# Patient Record
Sex: FEMALE | Race: WHITE | NOT HISPANIC OR LATINO | URBAN - METROPOLITAN AREA
[De-identification: names, ages, dates, MRNs, and addresses within clinical notes are randomized per-mention and may not be internally consistent; named-entity substitution may affect disease eponyms.]

---

## 2017-02-22 ENCOUNTER — AMBULATORY - HEALTHEAST (OUTPATIENT)
Dept: LAB | Facility: CLINIC | Age: 82
End: 2017-02-22

## 2017-02-22 ENCOUNTER — COMMUNICATION - HEALTHEAST (OUTPATIENT)
Dept: LAB | Facility: CLINIC | Age: 82
End: 2017-02-22

## 2017-02-22 DIAGNOSIS — E03.9 HYPOTHYROID: ICD-10-CM

## 2017-02-23 ENCOUNTER — AMBULATORY - HEALTHEAST (OUTPATIENT)
Dept: FAMILY MEDICINE | Facility: CLINIC | Age: 82
End: 2017-02-23

## 2017-02-23 DIAGNOSIS — E03.9 HYPOTHYROID: ICD-10-CM

## 2017-03-01 ENCOUNTER — OFFICE VISIT - HEALTHEAST (OUTPATIENT)
Dept: FAMILY MEDICINE | Facility: CLINIC | Age: 82
End: 2017-03-01

## 2017-03-01 DIAGNOSIS — R05.9 COUGH: ICD-10-CM

## 2017-03-28 ENCOUNTER — COMMUNICATION - HEALTHEAST (OUTPATIENT)
Dept: FAMILY MEDICINE | Facility: CLINIC | Age: 82
End: 2017-03-28

## 2017-03-30 ENCOUNTER — OFFICE VISIT - HEALTHEAST (OUTPATIENT)
Dept: FAMILY MEDICINE | Facility: CLINIC | Age: 82
End: 2017-03-30

## 2017-03-30 DIAGNOSIS — R05.9 COUGH: ICD-10-CM

## 2017-03-30 ASSESSMENT — MIFFLIN-ST. JEOR: SCORE: 1165.51

## 2017-04-06 ENCOUNTER — RECORDS - HEALTHEAST (OUTPATIENT)
Dept: ADMINISTRATIVE | Facility: OTHER | Age: 82
End: 2017-04-06

## 2017-04-06 ENCOUNTER — RECORDS - HEALTHEAST (OUTPATIENT)
Dept: BONE DENSITY | Facility: CLINIC | Age: 82
End: 2017-04-06

## 2017-04-06 DIAGNOSIS — Z78.0 ASYMPTOMATIC MENOPAUSAL STATE: ICD-10-CM

## 2017-04-06 DIAGNOSIS — R05.9 COUGH: ICD-10-CM

## 2017-04-11 ENCOUNTER — RECORDS - HEALTHEAST (OUTPATIENT)
Dept: ADMINISTRATIVE | Facility: OTHER | Age: 82
End: 2017-04-11

## 2017-04-20 ENCOUNTER — RECORDS - HEALTHEAST (OUTPATIENT)
Dept: ADMINISTRATIVE | Facility: OTHER | Age: 82
End: 2017-04-20

## 2017-04-24 ENCOUNTER — AMBULATORY - HEALTHEAST (OUTPATIENT)
Dept: FAMILY MEDICINE | Facility: CLINIC | Age: 82
End: 2017-04-24

## 2017-04-24 ENCOUNTER — OFFICE VISIT - HEALTHEAST (OUTPATIENT)
Dept: FAMILY MEDICINE | Facility: CLINIC | Age: 82
End: 2017-04-24

## 2017-04-24 DIAGNOSIS — R05.9 COUGH: ICD-10-CM

## 2017-04-24 DIAGNOSIS — E03.9 HYPOTHYROID: ICD-10-CM

## 2017-04-24 DIAGNOSIS — I10 ESSENTIAL HYPERTENSION: ICD-10-CM

## 2017-04-24 LAB
CHOLEST SERPL-MCNC: 242 MG/DL
FASTING STATUS PATIENT QL REPORTED: YES
HDLC SERPL-MCNC: 67 MG/DL
LDLC SERPL CALC-MCNC: 154 MG/DL
TRIGL SERPL-MCNC: 103 MG/DL

## 2017-04-24 RX ORDER — GABAPENTIN 100 MG/1
CAPSULE ORAL
Refills: 1 | Status: SHIPPED | COMMUNITY
Start: 2017-04-11

## 2017-04-24 RX ORDER — GABAPENTIN 300 MG/1
CAPSULE ORAL
Refills: 1 | Status: SHIPPED | COMMUNITY
Start: 2017-04-11

## 2017-05-24 ENCOUNTER — RECORDS - HEALTHEAST (OUTPATIENT)
Dept: ADMINISTRATIVE | Facility: OTHER | Age: 82
End: 2017-05-24

## 2017-06-20 ENCOUNTER — RECORDS - HEALTHEAST (OUTPATIENT)
Dept: ADMINISTRATIVE | Facility: OTHER | Age: 82
End: 2017-06-20

## 2017-06-20 LAB
LAB AP CHARGES (HE HISTORICAL CONVERSION): NORMAL
PATH REPORT.COMMENTS IMP SPEC: NORMAL
PATH REPORT.COMMENTS IMP SPEC: NORMAL
PATH REPORT.FINAL DX SPEC: NORMAL
PATH REPORT.GROSS SPEC: NORMAL
PATH REPORT.MICROSCOPIC SPEC OTHER STN: NORMAL
PATH REPORT.RELEVANT HX SPEC: NORMAL
RESULT FLAG (HE HISTORICAL CONVERSION): NORMAL

## 2017-07-31 ENCOUNTER — COMMUNICATION - HEALTHEAST (OUTPATIENT)
Dept: FAMILY MEDICINE | Facility: CLINIC | Age: 82
End: 2017-07-31

## 2017-07-31 DIAGNOSIS — E03.9 HYPOTHYROIDISM: ICD-10-CM

## 2017-09-19 ENCOUNTER — OFFICE VISIT - HEALTHEAST (OUTPATIENT)
Dept: FAMILY MEDICINE | Facility: CLINIC | Age: 82
End: 2017-09-19

## 2017-09-19 DIAGNOSIS — I10 HYPERTENSION: ICD-10-CM

## 2017-09-19 DIAGNOSIS — R05.9 COUGH: ICD-10-CM

## 2017-09-29 ENCOUNTER — RECORDS - HEALTHEAST (OUTPATIENT)
Dept: ADMINISTRATIVE | Facility: OTHER | Age: 82
End: 2017-09-29

## 2017-10-02 ENCOUNTER — HOSPITAL ENCOUNTER (OUTPATIENT)
Dept: ULTRASOUND IMAGING | Facility: HOSPITAL | Age: 82
Discharge: HOME | End: 2017-10-02
Attending: PSYCHIATRY & NEUROLOGY

## 2017-10-02 DIAGNOSIS — G43.109 COMPLICATED MIGRAINE: ICD-10-CM

## 2017-10-02 DIAGNOSIS — H53.9 TRANSIENT VISION DISTURBANCE OF BOTH EYES: ICD-10-CM

## 2017-10-02 DIAGNOSIS — I10 HTN (HYPERTENSION): ICD-10-CM

## 2017-10-02 DIAGNOSIS — H54.7 VISUAL LOSS: ICD-10-CM

## 2017-10-09 ENCOUNTER — COMMUNICATION - HEALTHEAST (OUTPATIENT)
Dept: FAMILY MEDICINE | Facility: CLINIC | Age: 82
End: 2017-10-09

## 2017-10-09 RX ORDER — BLOOD PRESSURE TEST KIT
KIT MISCELLANEOUS
Qty: 1 EACH | Refills: 0 | Status: SHIPPED | OUTPATIENT
Start: 2017-10-09

## 2017-10-24 ENCOUNTER — OFFICE VISIT - HEALTHEAST (OUTPATIENT)
Dept: FAMILY MEDICINE | Facility: CLINIC | Age: 82
End: 2017-10-24

## 2017-10-24 DIAGNOSIS — M25.552 ACUTE HIP PAIN, LEFT: ICD-10-CM

## 2017-10-24 DIAGNOSIS — S00.03XA HEMATOMA OF SCALP, INITIAL ENCOUNTER: ICD-10-CM

## 2017-10-27 ENCOUNTER — OFFICE VISIT - HEALTHEAST (OUTPATIENT)
Dept: FAMILY MEDICINE | Facility: CLINIC | Age: 82
End: 2017-10-27

## 2017-10-27 DIAGNOSIS — M53.3 PAIN IN THE COCCYX: ICD-10-CM

## 2017-10-27 DIAGNOSIS — M54.50 LOW BACK PAIN: ICD-10-CM

## 2017-10-30 ENCOUNTER — COMMUNICATION - HEALTHEAST (OUTPATIENT)
Dept: FAMILY MEDICINE | Facility: CLINIC | Age: 82
End: 2017-10-30

## 2017-10-30 DIAGNOSIS — M54.50 LOWER BACK PAIN: ICD-10-CM

## 2017-10-31 ENCOUNTER — RECORDS - HEALTHEAST (OUTPATIENT)
Dept: ADMINISTRATIVE | Facility: OTHER | Age: 82
End: 2017-10-31

## 2017-11-08 ENCOUNTER — RECORDS - HEALTHEAST (OUTPATIENT)
Dept: ADMINISTRATIVE | Facility: OTHER | Age: 82
End: 2017-11-08

## 2017-12-12 ENCOUNTER — RECORDS - HEALTHEAST (OUTPATIENT)
Dept: ADMINISTRATIVE | Facility: OTHER | Age: 82
End: 2017-12-12

## 2017-12-13 ENCOUNTER — COMMUNICATION - HEALTHEAST (OUTPATIENT)
Dept: FAMILY MEDICINE | Facility: CLINIC | Age: 82
End: 2017-12-13

## 2017-12-13 DIAGNOSIS — R05.9 COUGH: ICD-10-CM

## 2017-12-15 RX ORDER — HYDROCHLOROTHIAZIDE 12.5 MG/1
TABLET ORAL
Qty: 90 TABLET | Refills: 3 | Status: SHIPPED | OUTPATIENT
Start: 2017-12-15

## 2017-12-21 ENCOUNTER — RECORDS - HEALTHEAST (OUTPATIENT)
Dept: ADMINISTRATIVE | Facility: OTHER | Age: 82
End: 2017-12-21

## 2018-01-04 ENCOUNTER — OFFICE VISIT - HEALTHEAST (OUTPATIENT)
Dept: FAMILY MEDICINE | Facility: CLINIC | Age: 83
End: 2018-01-04

## 2018-01-04 DIAGNOSIS — R05.9 COUGH: ICD-10-CM

## 2018-02-01 ENCOUNTER — RECORDS - HEALTHEAST (OUTPATIENT)
Dept: ADMINISTRATIVE | Facility: OTHER | Age: 83
End: 2018-02-01

## 2018-05-02 ENCOUNTER — RECORDS - HEALTHEAST (OUTPATIENT)
Dept: ADMINISTRATIVE | Facility: OTHER | Age: 83
End: 2018-05-02

## 2018-05-21 ENCOUNTER — COMMUNICATION - HEALTHEAST (OUTPATIENT)
Dept: FAMILY MEDICINE | Facility: CLINIC | Age: 83
End: 2018-05-21

## 2018-05-21 DIAGNOSIS — E03.9 HYPOTHYROIDISM: ICD-10-CM

## 2018-05-21 RX ORDER — LEVOTHYROXINE SODIUM 50 UG/1
TABLET ORAL
Qty: 40 TABLET | Refills: 3 | Status: SHIPPED | OUTPATIENT
Start: 2018-05-21

## 2018-08-07 ENCOUNTER — OFFICE VISIT - HEALTHEAST (OUTPATIENT)
Dept: FAMILY MEDICINE | Facility: CLINIC | Age: 83
End: 2018-08-07

## 2018-08-07 DIAGNOSIS — E03.9 HYPOTHYROIDISM: ICD-10-CM

## 2018-08-07 DIAGNOSIS — I10 HTN (HYPERTENSION): ICD-10-CM

## 2018-08-07 LAB
ANION GAP SERPL CALCULATED.3IONS-SCNC: 10 MMOL/L (ref 5–18)
BUN SERPL-MCNC: 16 MG/DL (ref 8–28)
CALCIUM SERPL-MCNC: 10.6 MG/DL (ref 8.5–10.5)
CHLORIDE BLD-SCNC: 102 MMOL/L (ref 98–107)
CO2 SERPL-SCNC: 28 MMOL/L (ref 22–31)
CREAT SERPL-MCNC: 0.98 MG/DL (ref 0.6–1.1)
GFR SERPL CREATININE-BSD FRML MDRD: 54 ML/MIN/1.73M2
GLUCOSE BLD-MCNC: 105 MG/DL (ref 70–125)
POTASSIUM BLD-SCNC: 4.6 MMOL/L (ref 3.5–5)
SODIUM SERPL-SCNC: 140 MMOL/L (ref 136–145)
T4 FREE SERPL-MCNC: 0.9 NG/DL (ref 0.7–1.8)
TSH SERPL DL<=0.005 MIU/L-ACNC: 5.21 UIU/ML (ref 0.3–5)

## 2018-08-16 ENCOUNTER — COMMUNICATION - HEALTHEAST (OUTPATIENT)
Dept: FAMILY MEDICINE | Facility: CLINIC | Age: 83
End: 2018-08-16

## 2019-01-19 ENCOUNTER — RECORDS - HEALTHEAST (OUTPATIENT)
Dept: ADMINISTRATIVE | Facility: OTHER | Age: 84
End: 2019-01-19

## 2019-02-12 ENCOUNTER — RECORDS - HEALTHEAST (OUTPATIENT)
Dept: ADMINISTRATIVE | Facility: OTHER | Age: 84
End: 2019-02-12

## 2019-03-01 ENCOUNTER — RECORDS - HEALTHEAST (OUTPATIENT)
Dept: ADMINISTRATIVE | Facility: OTHER | Age: 84
End: 2019-03-01

## 2019-03-08 ENCOUNTER — RECORDS - HEALTHEAST (OUTPATIENT)
Dept: ADMINISTRATIVE | Facility: OTHER | Age: 84
End: 2019-03-08

## 2019-03-12 ENCOUNTER — RECORDS - HEALTHEAST (OUTPATIENT)
Dept: ADMINISTRATIVE | Facility: OTHER | Age: 84
End: 2019-03-12

## 2019-04-09 ENCOUNTER — RECORDS - HEALTHEAST (OUTPATIENT)
Dept: ADMINISTRATIVE | Facility: OTHER | Age: 84
End: 2019-04-09

## 2021-05-30 ENCOUNTER — RECORDS - HEALTHEAST (OUTPATIENT)
Dept: ADMINISTRATIVE | Facility: CLINIC | Age: 86
End: 2021-05-30

## 2021-05-30 VITALS — BODY MASS INDEX: 27.98 KG/M2 | HEIGHT: 64 IN | WEIGHT: 163.9 LBS

## 2021-05-30 VITALS — BODY MASS INDEX: 28.26 KG/M2 | WEIGHT: 162.06 LBS

## 2021-05-30 VITALS — BODY MASS INDEX: 28.46 KG/M2 | WEIGHT: 163.2 LBS

## 2021-05-31 VITALS — WEIGHT: 164.1 LBS | BODY MASS INDEX: 28.61 KG/M2

## 2021-05-31 VITALS — WEIGHT: 168 LBS | BODY MASS INDEX: 29.29 KG/M2

## 2021-05-31 VITALS — WEIGHT: 165 LBS | BODY MASS INDEX: 28.77 KG/M2

## 2021-05-31 VITALS — WEIGHT: 160.7 LBS | BODY MASS INDEX: 28.02 KG/M2

## 2021-06-01 ENCOUNTER — RECORDS - HEALTHEAST (OUTPATIENT)
Dept: ADMINISTRATIVE | Facility: CLINIC | Age: 86
End: 2021-06-01

## 2021-06-01 VITALS — WEIGHT: 160.56 LBS | BODY MASS INDEX: 28 KG/M2

## 2021-06-02 ENCOUNTER — RECORDS - HEALTHEAST (OUTPATIENT)
Dept: ADMINISTRATIVE | Facility: CLINIC | Age: 86
End: 2021-06-02

## 2021-06-05 ENCOUNTER — RECORDS - HEALTHEAST (OUTPATIENT)
Dept: INTERNAL MEDICINE | Facility: CLINIC | Age: 86
End: 2021-06-05

## 2021-06-05 DIAGNOSIS — R42 DIZZINESS AND GIDDINESS: ICD-10-CM

## 2021-06-09 NOTE — PROGRESS NOTES
81-year-old female presents with a one-week history of upper respiratory tract infection particularly that of cough.  She does not have any other systemic symptoms to indicate use for antibiotics.  I recommend supportive cares in time.  Declined cough medicine.  I would like her to follow-up with her primary care provider if she is not better in 1-2 weeks.  Patient verbalized understanding and agree with the plan      ASSESSMENT/PLAN:  1. Cough    CHIEF COMPLAINT:  Chief Complaint   Patient presents with     Cough     x 1 week, productive cough. Took Mucinex did not help     Fatigue     Headache       HISTORY OF PRESENT ILLNESS:  Keara is a 81 y.o. female presenting to the clinic today for a cough. She has had this for one week. Her cough was dry, but starting to become productive today. No fevers. No chest pain or shortness of breath. She does have headaches, and has been feeling tired. She does not have any underlying lung problems. Her  has been sick as well. Her pertussis vaccine is up to date. She did not get her seasonal influenza. She has been taking Mucinex and Tylenol with little relief.     REVIEW OF SYSTEMS:   No chills or body aches. All other systems are negative.    PFSH:  No new history.     TOBACCO USE:  History   Smoking Status     Never Smoker   Smokeless Tobacco     Never Used       VITALS:  Vitals:    03/01/17 1234   BP: 112/64   Pulse: (!) 59   Temp: 98  F (36.7  C)   TempSrc: Oral   SpO2: 96%   Weight: 162 lb 1 oz (73.5 kg)     Wt Readings from Last 3 Encounters:   03/01/17 162 lb 1 oz (73.5 kg)   12/22/16 158 lb 9.6 oz (71.9 kg)   09/27/16 154 lb 12.8 oz (70.2 kg)       PHYSICAL EXAM:  Constitutional: Patient is oriented to person, place, and time. Patient appears well-developed and well-nourished. No distress.   Head: Normocephalic and atraumatic.   Right Ear: External ear normal. Mild erythema to TM.  Left Ear: External ear normal. Mild erythema to TM.  Nose: Nose normal.    Mouth/Throat: Oropharynx is clear and moist. No oropharyngeal exudate.   Eyes: Conjunctivae and EOM are normal. Pupils are equal, round, and reactive to light. Right eye exhibits no discharge. Left eye exhibits no discharge. No scleral icterus.   Neck: Neck supple. No JVD present. No tracheal deviation present. No thyromegaly present.   Cardiovascular: Normal rate, regular rhythm, normal heart sounds and intact distal pulses. No murmur heard.   Pulmonary/Chest: Effort normal and breath sounds normal. No stridor. No respiratory distress. Patient has no wheezes, no rales, exhibits no tenderness.       Results for orders placed or performed in visit on 02/22/17   Thyroid Stimulating Hormone (TSH)   Result Value Ref Range    TSH 7.10 (H) 0.30 - 5.00 uIU/mL         ADDITIONAL HISTORY SUMMARIZED (2): None.  DECISION TO OBTAIN EXTRA INFORMATION (1): None.   RADIOLOGY TESTS (1): None.  LABS (1): None.  MEDICINE TESTS (1): None.  INDEPENDENT REVIEW (2 each): None.     The visit lasted a total of 7 minutes face to face with the patient. Over 50% of the time was spent counseling and educating the patient about viral illness care.    Brittany WALKER, am scribing for and in the presence of, Dr. Martinez.    Dr. Michelle WALKER, personally performed the services described in this documentation, as scribed by Brittany Turner in my presence, and it is both accurate and complete.    MEDICATIONS:  Current Outpatient Prescriptions   Medication Sig Dispense Refill     calcium carbonate (OS-EMILY) 600 mg (1,500 mg) tablet Take 600 mg by mouth daily.        cholecalciferol, vitamin D3, 1,000 unit tablet Take 1,000 Units by mouth daily.       levothyroxine (SYNTHROID) 50 MCG tablet Take 1 tablet orally daily except for 2 days of the week, take 2 tablets daily on 2 days of the week 40 tablet 1     lisinopril-hydrochlorothiazide (PRINZIDE,ZESTORETIC) 20-12.5 mg per tablet TAKE 1 TABLET BY MOUTH DAILY. 90 tablet 1     No current  facility-administered medications for this visit.        Total data points: 0

## 2021-06-09 NOTE — PROGRESS NOTES
81-year-old female complains of a nonproductive cough of about 4 weeks duration.  She was seen several weeks ago by me and told to continue to monitor her symptoms.  Her symptoms have not improved.  I will like to switch her blood pressure medication (lisinopril-HCTZ) over to just hydrochlorothiazide.  She will come back in a couple weeks for blood pressure check.  I will also give her ranitidine for her GERD symptoms and Claritin for her postnasal drip.  As for her cough, she may take Tessalon Perles every 6 hours as needed.  Patient has a follow-up appointment with her primary care provider to discuss her thyroid and and I recommend that she follows up regarding her cough at this same visit if possible.  If her symptoms do not improve over the next week or if they worsen then to call me.  The patient verbalized understanding and agreed with the plan.    ASSESSMENT/PLAN:  1. Cough  - hydroCHLOROthiazide (HYDRODIURIL) 12.5 MG tablet; Take 1 tablet (12.5 mg total) by mouth daily.  Dispense: 30 tablet; Refill: 0  - ranitidine (ZANTAC) 150 MG tablet; Take one tablet daily  Dispense: 30 tablet; Refill: 0  - loratadine (CLARITIN) 10 mg tablet; Take 1 tablet (10 mg total) by mouth daily.  Dispense: 30 tablet; Refill: 0  - benzonatate (TESSALON PERLES) 100 MG capsule; Take 1 capsule (100 mg total) by mouth every 6 (six) hours as needed for cough.  Dispense: 30 capsule; Refill: 0      CHIEF COMPLAINT:  Chief Complaint   Patient presents with     Cough     cough        HISTORY OF PRESENT ILLNESS:  Keara is a 81 y.o. female presenting to the clinic today for a follow up for a cough. She was seen for cough on 3/1/2017, and supportive cares were recommended. She does take lisinopril-hydrochlorothiazide, and feels like this is contributing to her cough. She has been on lisinopril for 2-3 years. This has been controlling her blood pressure well. She has a dry hacking cough. She is having some rhinorrhea. She feels like her cough  "is causing some abdominal and back soreness. She does not have any environmental allergies. She does have reflux/heartburn. She is not taking anything for her reflux symptoms.     REVIEW OF SYSTEMS:   Denies fevers, chills, body aches. All other systems are negative.    PFSH:  Not a smoker, no smoke exposure.     TOBACCO USE:  History   Smoking Status     Never Smoker   Smokeless Tobacco     Never Used       VITALS:  Vitals:    03/30/17 1418   BP: 118/72   Patient Site: Left Arm   Patient Position: Sitting   Cuff Size: Adult Regular   Pulse: (!) 56   Temp: 98  F (36.7  C)   SpO2: 98%   Weight: 163 lb 14.4 oz (74.3 kg)   Height: 5' 3.5\" (1.613 m)     Wt Readings from Last 3 Encounters:   03/30/17 163 lb 14.4 oz (74.3 kg)   03/01/17 162 lb 1 oz (73.5 kg)   12/22/16 158 lb 9.6 oz (71.9 kg)       PHYSICAL EXAM:  Constitutional: Patient is oriented to person, place, and time. Patient appears well-developed and well-nourished. No distress.   Head: Normocephalic and atraumatic.   Right Ear: External ear normal. Ear canal and TM normal.   Left Ear: External ear normal. Ear canal and TM normal.   Nose: Nose normal.   Mouth/Throat: Oropharynx is clear and moist. No oropharyngeal exudate.   Eyes: Conjunctivae and EOM are normal. Pupils are equal, round, and reactive to light. Right eye exhibits no discharge. Left eye exhibits no discharge. No scleral icterus.   Cardiovascular: Normal rate, regular rhythm, normal heart sounds and intact distal pulses. No murmur heard.   Pulmonary/Chest: Effort normal and breath sounds normal. No stridor. No respiratory distress. Patient has no wheezes, no rales, exhibits no tenderness.       Results for orders placed or performed in visit on 02/22/17   Thyroid Stimulating Hormone (TSH)   Result Value Ref Range    TSH 7.10 (H) 0.30 - 5.00 uIU/mL         ADDITIONAL HISTORY SUMMARIZED (2): None.  DECISION TO OBTAIN EXTRA INFORMATION (1): None.   RADIOLOGY TESTS (1): None.  LABS (1): " None.  MEDICINE TESTS (1): None.  INDEPENDENT REVIEW (2 each): None.     The visit lasted a total of 18 minutes face to face with the patient. Over 50% of the time was spent counseling and educating the patient about cough etiologies.    Brittany WALKER, am scribing for and in the presence of, Dr. Martinez.    Dr. Michelle WALKER, personally performed the services described in this documentation, as scribed by Brittany Turner in my presence, and it is both accurate and complete.    MEDICATIONS:  Current Outpatient Prescriptions   Medication Sig Dispense Refill     calcium carbonate (OS-EMILY) 600 mg (1,500 mg) tablet Take 600 mg by mouth daily.        cholecalciferol, vitamin D3, 1,000 unit tablet Take 1,000 Units by mouth daily.       levothyroxine (SYNTHROID) 50 MCG tablet Take 1 tablet orally daily except for 2 days of the week, take 2 tablets daily on 2 days of the week 40 tablet 1     lisinopril-hydrochlorothiazide (PRINZIDE,ZESTORETIC) 20-12.5 mg per tablet TAKE 1 TABLET BY MOUTH DAILY. 90 tablet 1     oxybutynin (DITROPAN) 5 MG tablet Take 5 mg by mouth.       benzonatate (TESSALON PERLES) 100 MG capsule Take 1 capsule (100 mg total) by mouth every 6 (six) hours as needed for cough. 30 capsule 0     hydroCHLOROthiazide (HYDRODIURIL) 12.5 MG tablet Take 1 tablet (12.5 mg total) by mouth daily. 30 tablet 0     loratadine (CLARITIN) 10 mg tablet Take 1 tablet (10 mg total) by mouth daily. 30 tablet 0     ranitidine (ZANTAC) 150 MG tablet Take one tablet daily 30 tablet 0     No current facility-administered medications for this visit.        Total data points: 0

## 2021-06-10 NOTE — PROGRESS NOTES
Assessment/Plan:        Diagnoses and all orders for this visit:    Essential hypertension  -     Lipid Cascade  -     Basic Metabolic Panel    Cough  -     hydroCHLOROthiazide (HYDRODIURIL) 12.5 MG tablet; Take 1 tablet (12.5 mg total) by mouth daily.  Dispense: 30 tablet; Refill: 5    Hypothyroid  -     Thyroid Stimulating Hormone (TSH)    Lump    Other orders  -     gabapentin (NEURONTIN) 100 MG capsule; ; Refill: 1  -     gabapentin (NEURONTIN) 300 MG capsule; ; Refill: 1  -     Cancel: levothyroxine (SYNTHROID) 50 MCG tablet; Take 1 tablet orally daily except for 2 days of the week, take 2 tablets daily on 2 days of the week  Dispense: 40 tablet; Refill: 1        For her hypertension, continue with hydrochlorothiazide.  Monitor blood pressure and discuss goals.  Lisinopril less likely causing her cough.  Cough has been chronic and has been stable.  Will hold off on imaging at this time.  She is to start omeprazole 20 mg daily for 2 weeks.  Update me in 2 weeks.  We will also do labs.  Await results prior to prescription refill for levothyroxine.  For the lump in her left cheek, looks benign.  Closely monitor for changes with time.  Avoid picking on it.  Continue with a warm compress as needed.  No additional treatment.  She was agreeable with the plans.  Subjective:    Patient ID: Keara Torres is a 81 y.o. female.    BROOKE Sarah is here for follow-up from her last office visit.  She was seen in of March.  She has been dealing with chronic cough for a couple of years, mostly dry and more noticeable at night.  She denies any fever, chills, shortness of breath.  No recent unexplained weight loss.  She denies any hemoptysis, loss of appetite.  She hydrate herself.  Sometimes feels like it tickle in her throat and has been hydrating herself.  Tried ranitidine and Claritin on a few instances with slight benefit.  She denies any issues with smoking.  Had increased amount of secondhand smoke exposure while she was  young.  She was also asked to switch her Prinzide to hydrochlorothiazide.  Has not been able to check her blood pressure since after the last visit.  Denies any chest pains, palpitations.    History of hypothyroidism.  History of esophageal stricture and occasional episodes of choking which has been stable for a number of years.  She has been taking her levothyroxine regularly as directed.  Denies any tremors, shaking.    She has been picking on her left cheek and was trying to squeeze out what she thought of as a joya.  Developed a lump and increase in size.  Has been stable since.  This was around 1 and half weeks ago.  Denies any pain, discharge or bleeding.  Tried warm compresses and the area.    Has been dealing with neck pain.  She has C3-C4 degenerative changes and will be having an injection today.  This is her first.  Will be following up with them in May after treatment.    Review of Systems  As above otherwise negative.          Objective:    Physical Exam  /78 (Patient Site: Left Arm, Patient Position: Sitting, Cuff Size: Adult Regular)  Pulse (!) 59  Wt 163 lb 3.2 oz (74 kg)  SpO2 98%  Breastfeeding? No  BMI 28.46 kg/m2    Vital signs noted above. AAO ×3.  HEENT no nasal discharge, moist oral mucosa. Ears:TMs intact, no fluid collection. Neck: Supple neck, nonpalpable cervical lymph nodes.  No thyromegaly.  Lungs: Clear to auscultation bilateral.  Heart: S1-S2 regular rate and rhythm, no murmurs were noted.  Abdomen:  with bowel sounds.  Extremities: No edema, pulses were full and equal.  Skin: Erythematous lump on her left cheek, beneath her left eye, soft, no discharge or bleeding, 1.5 x 1 cm.

## 2021-06-13 NOTE — PROGRESS NOTES
Assessment/Plan:        Diagnoses and all orders for this visit:    Cough    Hypertension    Other orders  -     azithromycin (ZITHROMAX Z-ROSA) 250 MG tablet; Take 2 tablets (500 mg) on  Day 1,  followed by 1 tablet (250 mg) once daily on Days 2 through 5.  Dispense: 6 tablet; Refill: 0        We will treat her with azithromycin.  Discussed regarding medication use and side effects.  Increase yogurt intake.  Fluid hydration.  Expectorant medication as needed.  Recheck in a week if persistent or worse.  To monitor her blood pressure and discuss goals.  If blood pressure uncontrolled, earlier follow-up.  Otherwise continue with hydrochlorothiazide 12.5 mg daily.  She was agreeable with the plans.  Subjective:    Patient ID: Keara Torres is a 82 y.o. female.    HPI    Keara is here with concerns about cough.  Started with a chest congestion, cold 10 days ago.  Denies any fever, chills.  Cough productive of yellowish green secretions.  No hemoptysis.  She denies any recent travel or exposure to anyone ill.  No shortness of breath.  Started to have decreased appetite in the past couple of days.  Sore throat and decrease in taste.  She does not hydrate herself as much.  Feels that the symptoms are worse.  Trying to take Aleve with minimal benefit.    She also has hypertension.  She has been doing well with the lisinopril but started having worsening of her cough.  Lisinopril was switched to hydrochlorothiazide last March.  She has not been able to check her blood pressure.  She feels that the medication is not as beneficial.  She feels that she is retaining fluid although not showing any edema.  Has been trying to eat healthy.  Denies any chest pains, palpitations    Review of Systems  As above otherwise negative.          Objective:    Physical Exam  /80 (Patient Site: Left Arm, Patient Position: Sitting, Cuff Size: Adult Regular)  Pulse (!) 58  Temp 98.1  F (36.7  C) (Oral)   Wt 164 lb 1.6 oz (74.4 kg)  SpO2  97%  Breastfeeding? No  BMI 28.61 kg/m2    Vital signs noted above. AAO ×3.  HEENT no nasal discharge, moist oral mucosa. Ears:TMs intact, no fluid collection. Neck: Supple neck, nonpalpable cervical lymph nodes.  Lungs: Clear to auscultation bilateral.  Heart: S1-S2 regular rate and rhythm, no murmurs were noted.  Abdomen:  with bowel sounds.  Extremities: No edema, pulses were full and equal.

## 2021-06-13 NOTE — PROGRESS NOTES
Assessment:     1. Low back pain  XR Lumbar Spine 4 or More VWS   2. Pain in the coccyx  XR Sacrum and Coccyx 2 or More VWS          Plan:     Low back pain and pain in her coccyx likely secondary to contusion from her previous fall.  Recommend she continue to rest and use Aleve as needed for discomfort.  If she is still continuing to have pain over the next week or 2 I recommend that she follow-up with her primary care physician for further evaluation and treatment.  She is agreeable with this plan.    Subjective:       82 y.o. female presents for evaluation of low back and in over her tailbone along with some bruising over her tailbone.  She was actually seen by myself on 10/24/2017 at which time she was having some left hip pain when she was walking.  She had had a fall 2 weeks prior and had been taking it easy until she started doing a lot more walking and walks over 1 mile and started having a lot of hip pain.  Hip x-ray was again reviewed and the results were negative for fracture.  Over the last 2 days she has started having more pain over her tailbone and mid low back.  She denies any new injury.  She has not had any numbness, weakness, tingling, or pain down her extremities.  No bowel or bladder problems.  She finds it hard to ambulate now.  She has used a heating pad as well as Aleve    The following portions of the patient's history were reviewed and updated as appropriate: allergies, current medications, past family history, past medical history, past social history, past surgical history and problem list.    Review of Systems  A 12 point comprehensive review of systems was negative except as noted.     Objective:      /70  Pulse 88  Temp 97.6  F (36.4  C) (Oral)   Resp 16  Wt 168 lb (76.2 kg)  SpO2 99%  Breastfeeding? No  BMI 29.29 kg/m2  General appearance: alert, appears stated age and cooperative, walks with a limp.  Back:  She has some mild tenderness in her mid low back.  No CVA  tenderness.  Straight leg raise is negative.  She has some ecchymosis noted over her coccyx.  Lungs: clear to auscultation bilaterally  Heart: regular rate and rhythm, S1, S2 normal, no murmur, click, rub or gallop  Abdomen: soft, non-tender; bowel sounds normal; no masses,  no organomegaly  Extremities: extremities normal, atraumatic, no cyanosis or edema     Lumbar spine and coccyx x-rays ordered and personally reviewed by myself.  She has a lot of osteophyte formation in the lumbar spine and degenerative changes noted but no acute fractures that I can appreciate.    Xr Lumbar Spine 4 Or More Vws    Result Date: 10/27/2017  [facility] XR LUMBAR SPINE 4 OR MORE VWS, XR SACRUM AND COCCYX 2 OR MORE VWS 10/27/2017 1:12 PM INDICATION: Low back pain. COMPARISON: None. FINDINGS: LUMBAR SPINE: There is transitional lumbosacral anatomy with partial sacralization of L5 on the right. Rudimentary ribs are seen at T12. There is focal convex right curvature at the lumbosacral junction. Lateral alignment otherwise normal. Multilevel anterior and lateral osteophyte formation with prominent left-sided osteophyte formation at L1-L2, L2-L3 and L4-L5. There is at least mild facet arthropathy at the L4-L5 and L5-S1 levels. Coarse aortic atherosclerotic calcifications. Scattered pelvic phleboliths. SACRUM: Transitional lumbosacral anatomy with partial sacralization of L5 on the right. SI joints are unremarkable. Degenerative changes left hip. Sacral lamina appear grossly intact. Scattered pelvic phleboliths. Normal bowel gas pattern. This report was electronically interpreted by: Dr. Kolton Appiah MD ON 10/27/2017 at 14:25    Xr Sacrum And Coccyx 2 Or More Vws    Result Date: 10/27/2017  [facility] XR LUMBAR SPINE 4 OR MORE VWS, XR SACRUM AND COCCYX 2 OR MORE VWS 10/27/2017 1:12 PM INDICATION: Low back pain. COMPARISON: None. FINDINGS: LUMBAR SPINE: There is transitional lumbosacral anatomy with partial  sacralization of L5 on the right. Rudimentary ribs are seen at T12. There is focal convex right curvature at the lumbosacral junction. Lateral alignment otherwise normal. Multilevel anterior and lateral osteophyte formation with prominent left-sided osteophyte formation at L1-L2, L2-L3 and L4-L5. There is at least mild facet arthropathy at the L4-L5 and L5-S1 levels. Coarse aortic atherosclerotic calcifications. Scattered pelvic phleboliths. SACRUM: Transitional lumbosacral anatomy with partial sacralization of L5 on the right. SI joints are unremarkable. Degenerative changes left hip. Sacral lamina appear grossly intact. Scattered pelvic phleboliths. Normal bowel gas pattern. This report was electronically interpreted by: Dr. Kolton Appiah MD ON 10/27/2017 at 14:25    Xr Hip Left 2 Or More Vws    Result Date: 10/24/2017  XR HIP LEFT 2 OR MORE VWS 10/24/2017 3:21 PM INDICATION: Pain in left hip. COMPARISON: None. FINDINGS: Moderate degenerative changes with joint space loss and osteophyte formation. Nothing acute evident. No fracture or dislocation. This report was electronically interpreted by: Dr. Brendan Bond MD ON 10/24/2017 at 15:29    Ct Head Without Contrast    Result Date: 9/26/2017  [facility] CT HEAD WO CONTRAST 9/26/2017 8:52 PM INDICATION: Blurred vision. TECHNIQUE: Routine. Dose reduction techniques were used. CONTRAST: None. COMPARISON:  Head CT 09/27/2016. FINDINGS: No intracranial hemorrhage, extraaxial collection, mass effect or CT evidence of acute infarct.  Mild presumed chronic small vessel ischemic changes. Small chronic lacunar infarction right thalamus as demonstrated on the previous MRI. Mild generalized volume loss. The ventricles are proportional to the sulci. Osseous structures are intact. The visualized orbits, paranasal sinuses and mastoid air cells are free of significant disease.     CONCLUSION: 1.  No CT finding of mass, infarct or hemorrhage. 2.  Small chronic lacunar  infarction right thalamus. 3.  Mild age related changes.     Us Carotid Bilateral    Result Date: 10/2/2017  US CAROTID BILATERAL 10/2/2017 9:13 AM INDICATION: Migraine with aura, not intractable, without status migrainosus. TECHNIQUE: Duplex exam performed utilizing 2D gray-scale imaging, Doppler interrogation with color-flow and spectral waveform analysis. COMPARISON: None. FINDINGS: RIGHT: There is mild atheromatous plaque. Normal waveforms with no significant stenosis. LEFT: There is mild atheromatous plaque. Normal waveforms with no significant stenosis. Both vertebral arteries and subclavian artery waveforms are normal. VELOCITY CHART: The following velocities were obtained in the RIGHT carotid system. CCA: 60/12 cm/s ICA: 81/19 cm/s ECA: 67/11 cm/s ICA/CCA: PS 1.35 The following velocities were obtained in the LEFT carotid system. CCA: 66/16 cm/s ICA: 54/17 cm/s ECA: 68/8 cm/s ICA/CCA: PS 0.82                            CONCLUSION: 1.  Mild atheromatous plaque in the carotid arteries. 2.  No significant stenosis on either side. Evaluation based on velocities and NASCET criteria.         This note has been dictated using voice recognition software. Any grammatical or context distortions are unintentional and inherent to the software

## 2021-06-13 NOTE — PROGRESS NOTES
Assessment:     1. Acute hip pain, left  XR Hip Left 2 or More VWS   2. Hematoma of scalp, initial encounter            Plan:     Dense of hip fracture.  I suspect this is likely a contusion.  Recommend Tylenol as needed for pain.  All of the symptoms are getting worse or not continuing to improve.  Recommend relative rest.    Reassurance given regarding her scalp hematoma.  This likely will take a number of weeks months to resolve completely.  Will up as needed.    Subjective:       82 y.o. female presents for evaluation of persistent left hip pain after she fell onto her left side and hit her head 2 weeks ago.  She has been feeling fine but has noticed a lot of pain in her left hip when she has been walking on it the last couple of days.  She has also noticed a soft lump on the back of her head.  Painful at all but feels spongy.  Does not have a headache.  She has had no memory issues, difficulty with speech, weakness, fatigue, blurry vision, or any other concerning symptoms.    The following portions of the patient's history were reviewed and updated as appropriate: allergies, current medications, past family history, past medical history, past social history, past surgical history and problem list.    Review of Systems  A 12 point comprehensive review of systems was negative except as noted.     Objective:      /72  Pulse 60  Temp 98  F (36.7  C) (Oral)   Resp 12  Wt 165 lb (74.8 kg)  SpO2 96%  Breastfeeding? No  BMI 28.77 kg/m2  General appearance: alert, appears stated age and cooperative  Head: Patient has what appears to be a hematoma on her posterior scalp measuring approximately 4 cm x 4 cm.  It is nontender and spongy.  Extremities: Patient has some tenderness over her lateral left hip.  She walks with a significant limp     X-ray ordered and personally reviewed by myself.  No evidence of fracture.    Xr Hip Left 2 Or More Vws    Result Date: 10/24/2017  XR HIP LEFT 2 OR MORE VWS 10/24/2017  3:21 PM INDICATION: Pain in left hip. COMPARISON: None. FINDINGS: Moderate degenerative changes with joint space loss and osteophyte formation. Nothing acute evident. No fracture or dislocation. This report was electronically interpreted by: Dr. Brendan Bond MD ON 10/24/2017 at 15:29    Ct Head Without Contrast    Result Date: 9/26/2017  [facility] CT HEAD WO CONTRAST 9/26/2017 8:52 PM INDICATION: Blurred vision. TECHNIQUE: Routine. Dose reduction techniques were used. CONTRAST: None. COMPARISON:  Head CT 09/27/2016. FINDINGS: No intracranial hemorrhage, extraaxial collection, mass effect or CT evidence of acute infarct.  Mild presumed chronic small vessel ischemic changes. Small chronic lacunar infarction right thalamus as demonstrated on the previous MRI. Mild generalized volume loss. The ventricles are proportional to the sulci. Osseous structures are intact. The visualized orbits, paranasal sinuses and mastoid air cells are free of significant disease.     CONCLUSION: 1.  No CT finding of mass, infarct or hemorrhage. 2.  Small chronic lacunar infarction right thalamus. 3.  Mild age related changes.     Us Carotid Bilateral    Result Date: 10/2/2017  US CAROTID BILATERAL 10/2/2017 9:13 AM INDICATION: Migraine with aura, not intractable, without status migrainosus. TECHNIQUE: Duplex exam performed utilizing 2D gray-scale imaging, Doppler interrogation with color-flow and spectral waveform analysis. COMPARISON: None. FINDINGS: RIGHT: There is mild atheromatous plaque. Normal waveforms with no significant stenosis. LEFT: There is mild atheromatous plaque. Normal waveforms with no significant stenosis. Both vertebral arteries and subclavian artery waveforms are normal. VELOCITY CHART: The following velocities were obtained in the RIGHT carotid system. CCA: 60/12 cm/s ICA: 81/19 cm/s ECA: 67/11 cm/s ICA/CCA: PS 1.35 The following velocities were obtained in the LEFT carotid system. CCA: 66/16 cm/s ICA:  54/17 cm/s ECA: 68/8 cm/s ICA/CCA: PS 0.82                            CONCLUSION: 1.  Mild atheromatous plaque in the carotid arteries. 2.  No significant stenosis on either side. Evaluation based on velocities and NASCET criteria.         This note has been dictated using voice recognition software. Any grammatical or context distortions are unintentional and inherent to the software

## 2021-06-15 NOTE — PROGRESS NOTES
Assessment/Plan:        Diagnoses and all orders for this visit:    Cough        We will initiate her on Z-Jose Cruz.  Discussed regarding medication use and side effects.  Precautionary measures, fluid hydration.  Recheck in a week if persistent or worse.  She was agreeable with the plans.  Subjective:    Patient ID: Keara Torres is a 82 y.o. female.    HPI    Keara is here with concerns about cough, sore throat, nasal congestion, headache.  Symptoms started 2 days ago.  A few weeks prior to that, she had similar symptoms but not as bad.  She was ill together with her .  They recovered until she developed her symptoms recently.  Her brother-in-law passed away last week from ALS.  She was helping her sister take care of him.  There was a lot of people and she was not sure if she was exposed to anyone sick.  Tomorrow and Saturday would be the .  She also notes cough, chest congestion.  She denies any shortness of breath, vomiting, hemoptysis.  Decreased appetite.  Tried TheraFlu, Tylenol.  No smoking or chronic lung disease.    Review of Systems  As above otherwise negative.          Objective:    Physical Exam  /60 (Patient Site: Left Arm, Patient Position: Sitting, Cuff Size: Adult Regular)  Pulse (!) 59  Temp 97.5  F (36.4  C) (Oral)   Wt 160 lb 11.2 oz (72.9 kg)  SpO2 97%  Breastfeeding? No  BMI 28.02 kg/m2    Vital signs noted above. AAO ×3.  HEENT no nasal discharge, moist oral mucosa. Ears:TMs intact, no fluid collection. Neck: Supple neck, nonpalpable cervical lymph nodes.  Lungs: Clear to auscultation bilateral.  Heart: S1-S2 regular rate and rhythm, no murmurs were noted.  Abdomen:  with bowel sounds.  Extremities: pulses were full and equal.

## 2021-06-19 NOTE — PROGRESS NOTES
"ASSESSMENT/PLAN:    HTN (hypertension)  Blood pressure is good control with hydrochlorothiazide 12.5 mg.  Will check a basic metabolic panel.  Patient stated that she will be moving to clinic as her primary care provider is not here anymore.  -     Basic Metabolic Panel    Hypothyroidism  She currently takes levothyroxine 50 mg on 5 days and 100 mg on 2 days over the weekend.  Will check a TSH and T4 today and adjust her dose if indicated  -     Thyroid Stimulating Hormone (TSH)  -     T4, Free    Orders Placed This Encounter   Procedures     Thyroid Stimulating Hormone (TSH)     T4, Free     Basic Metabolic Panel     J[facility] LAV       CHIEF COMPLAINT:  Chief Complaint   Patient presents with     med check     Pt wants her blood drawn, Pt sweats a lot     Medication Refill       HISTORY OF PRESENT ILLNESS:  Keara is a 83 y.o. female presenting to the clinic today for medication check.     Hypertension: She continues on 12.5 mg HCTZ daily. Her blood pressure today is 130/80.     Hypothyroidism: She continues on 50 mcg levothyroxine daily. She takes two doses on Saturday and Sunday. She has been sweating profusely. Her heart is not pounding but she feels she is \"ahead of herself\".     REVIEW OF SYSTEMS:   She denies any frequent urination or urinary issues. She has been trying to get back into walking.    Constitutional: Negative.   HENT: Negative.   Eyes: Negative.   Respiratory: Negative.   Cardiovascular: Negative.   Gastrointestinal: Negative.   Endocrine: Negative.   Genitourinary: Negative.   Musculoskeletal: Negative.   Skin: Negative.   Allergic/Immunologic: Negative.   Neurological: Negative.   Hematological: Negative.   Psychiatric/Behavioral: Negative.   All other systems are negative.    PFSH:  Social: She went to [place] this summer.   Surgical: Bladder surgery 2017.     TOBACCO USE:  History   Smoking Status     Never Smoker   Smokeless Tobacco     Never Used       VITALS:  Vitals:    08/07/18 1108   BP: " 130/80   Patient Site: Left Arm   Patient Position: Sitting   Cuff Size: Adult Regular   Pulse: (!) 56   Weight: 160 lb 9 oz (72.8 kg)     Wt Readings from Last 3 Encounters:   08/07/18 160 lb 9 oz (72.8 kg)   01/11/18 160 lb (72.6 kg)   01/04/18 160 lb 11.2 oz (72.9 kg)       PHYSICAL EXAM:  Constitutional: Patient is oriented to person, place, and time. Patient appears well-developed and well-nourished. No distress.   Cardiovascular: Normal rate, regular rhythm, normal heart sounds and intact distal pulses. No murmur heard.   Pulmonary/Chest: Effort normal and breath sounds normal. No stridor. No respiratory distress. Patient has no wheezes, no rales, exhibits no tenderness.   Neurological: Patient is alert and oriented to person, place, and time. Patient has normal reflexes. No cranial nerve deficit. Coordination normal.   Skin: Skin is warm and dry. No rash noted. Patient is not diaphoretic. No erythema. No pallor.    Results for orders placed or performed in visit on 09/29/17   Sedimentation Rate   Result Value Ref Range    Sed Rate 2 0 - 20 mm/hr   Vitamin B12   Result Value Ref Range    Vitamin B-12 234 213 - 816 pg/mL   Thyroid Stimulating Hormone (TSH)   Result Value Ref Range    TSH 3.45 0.30 - 5.00 uIU/mL       DATA REVIEWED:  ADDITIONAL HISTORY SUMMARIZED (2): None.   DECISION TO OBTAIN EXTRA INFORMATION (1): None.   RADIOLOGY TESTS (1): None.  LABS (1): Ordered labs today.  MEDICINE TESTS (1): None.  INDEPENDENT REVIEW (2 each): None.     The visit lasted a total of 10 minutes face to face with the patient. Over 50% of the time was spent counseling and educating the patient about medication check.     I, Alondra Grfifin, am scribing for and in the presence of Dr. Martinez.  I, Bogdan Osuna MD , personally performed the services described in this documentation, as scribed by Alondra Griffin in my presence, and it is both accurate and complete.       MEDICATIONS:  Current Outpatient Prescriptions    Medication Sig Dispense Refill     aspirin 81 MG EC tablet Take 81 mg by mouth daily.       blood pressure monitor Kit Use as directed 1 each 0     calcium carbonate (OS-EMILY) 600 mg (1,500 mg) tablet Take 600 mg by mouth daily.        cholecalciferol, vitamin D3, 1,000 unit tablet Take 1,000 Units by mouth daily.       hydroCHLOROthiazide (HYDRODIURIL) 12.5 MG tablet Take 1 tablet by mouth once a day 90 tablet 3     levothyroxine (SYNTHROID, LEVOTHROID) 50 MCG tablet TAKE ONE TABLET BY MOUTH ONE TIME DAILY EXCEPT FOR 2 DAYS OF THE WEEK TAKE 2 TABLETS 40 tablet 3     gabapentin (NEURONTIN) 100 MG capsule   1     gabapentin (NEURONTIN) 300 MG capsule   1     No current facility-administered medications for this visit.         Total data points: 1

## 2021-06-26 ENCOUNTER — HEALTH MAINTENANCE LETTER (OUTPATIENT)
Age: 86
End: 2021-06-26

## 2021-08-08 ENCOUNTER — LAB REQUISITION (OUTPATIENT)
Dept: LAB | Facility: CLINIC | Age: 86
End: 2021-08-08
Payer: COMMERCIAL

## 2021-08-08 DIAGNOSIS — R97.0 ELEVATED CARCINOEMBRYONIC ANTIGEN (CEA): ICD-10-CM

## 2021-08-09 ENCOUNTER — LAB REQUISITION (OUTPATIENT)
Dept: LAB | Facility: CLINIC | Age: 86
End: 2021-08-09
Payer: COMMERCIAL

## 2021-08-09 DIAGNOSIS — R53.1 WEAKNESS: ICD-10-CM

## 2021-08-10 LAB
ANION GAP SERPL CALCULATED.3IONS-SCNC: 5 MMOL/L (ref 5–18)
BUN SERPL-MCNC: 12 MG/DL (ref 8–28)
CALCIUM SERPL-MCNC: 9.7 MG/DL (ref 8.5–10.5)
CHLORIDE BLD-SCNC: 100 MMOL/L (ref 98–107)
CO2 SERPL-SCNC: 26 MMOL/L (ref 22–31)
CREAT SERPL-MCNC: 0.76 MG/DL (ref 0.6–1.1)
ERYTHROCYTE [DISTWIDTH] IN BLOOD BY AUTOMATED COUNT: 13.2 % (ref 10–15)
FOLATE SERPL-MCNC: 9.3 NG/ML
GFR SERPL CREATININE-BSD FRML MDRD: 72 ML/MIN/1.73M2
GLUCOSE BLD-MCNC: 89 MG/DL (ref 70–125)
HBA1C MFR BLD: 5.1 %
HCT VFR BLD AUTO: 36.7 % (ref 35–47)
HGB BLD-MCNC: 12.3 G/DL (ref 11.7–15.7)
MCH RBC QN AUTO: 31.6 PG (ref 26.5–33)
MCHC RBC AUTO-ENTMCNC: 33.5 G/DL (ref 31.5–36.5)
MCV RBC AUTO: 94 FL (ref 78–100)
PLATELET # BLD AUTO: 261 10E3/UL (ref 150–450)
POTASSIUM BLD-SCNC: 4.5 MMOL/L (ref 3.5–5)
RBC # BLD AUTO: 3.89 10E6/UL (ref 3.8–5.2)
SODIUM SERPL-SCNC: 131 MMOL/L (ref 136–145)
VIT B12 SERPL-MCNC: 520 PG/ML (ref 213–816)
WBC # BLD AUTO: 4.5 10E3/UL (ref 4–11)

## 2021-08-10 PROCEDURE — 82607 VITAMIN B-12: CPT | Mod: ORL | Performed by: INTERNAL MEDICINE

## 2021-08-10 PROCEDURE — 82746 ASSAY OF FOLIC ACID SERUM: CPT | Mod: ORL | Performed by: INTERNAL MEDICINE

## 2021-08-10 PROCEDURE — 36415 COLL VENOUS BLD VENIPUNCTURE: CPT | Mod: ORL | Performed by: INTERNAL MEDICINE

## 2021-08-10 PROCEDURE — P9604 ONE-WAY ALLOW PRORATED TRIP: HCPCS | Mod: ORL | Performed by: INTERNAL MEDICINE

## 2021-08-10 PROCEDURE — 85027 COMPLETE CBC AUTOMATED: CPT | Mod: ORL | Performed by: INTERNAL MEDICINE

## 2021-08-10 PROCEDURE — 80048 BASIC METABOLIC PNL TOTAL CA: CPT | Mod: ORL | Performed by: INTERNAL MEDICINE

## 2021-08-10 PROCEDURE — 83036 HEMOGLOBIN GLYCOSYLATED A1C: CPT | Performed by: INTERNAL MEDICINE

## 2021-08-10 PROCEDURE — P9603 ONE-WAY ALLOW PRORATED MILES: HCPCS | Performed by: INTERNAL MEDICINE

## 2021-10-11 ENCOUNTER — HEALTH MAINTENANCE LETTER (OUTPATIENT)
Age: 86
End: 2021-10-11

## 2022-07-17 ENCOUNTER — HEALTH MAINTENANCE LETTER (OUTPATIENT)
Age: 87
End: 2022-07-17

## 2022-09-25 ENCOUNTER — HEALTH MAINTENANCE LETTER (OUTPATIENT)
Age: 87
End: 2022-09-25

## 2023-08-05 ENCOUNTER — HEALTH MAINTENANCE LETTER (OUTPATIENT)
Age: 88
End: 2023-08-05

## 2025-08-14 ENCOUNTER — LAB REQUISITION (OUTPATIENT)
Dept: LAB | Facility: CLINIC | Age: 89
End: 2025-08-14
Payer: COMMERCIAL

## 2025-08-14 DIAGNOSIS — I10 ESSENTIAL (PRIMARY) HYPERTENSION: ICD-10-CM

## 2025-08-15 LAB
ANION GAP SERPL CALCULATED.3IONS-SCNC: 7 MMOL/L (ref 7–15)
BUN SERPL-MCNC: 14.7 MG/DL (ref 8–23)
CALCIUM SERPL-MCNC: 8.7 MG/DL (ref 8.8–10.4)
CHLORIDE SERPL-SCNC: 100 MMOL/L (ref 98–107)
CREAT SERPL-MCNC: 0.64 MG/DL (ref 0.51–0.95)
EGFRCR SERPLBLD CKD-EPI 2021: 84 ML/MIN/1.73M2
ERYTHROCYTE [DISTWIDTH] IN BLOOD BY AUTOMATED COUNT: 14.4 % (ref 10–15)
GLUCOSE SERPL-MCNC: 116 MG/DL (ref 70–99)
HCO3 SERPL-SCNC: 22 MMOL/L (ref 22–29)
HCT VFR BLD AUTO: 21.4 % (ref 35–47)
HGB BLD-MCNC: 7.1 G/DL (ref 11.7–15.7)
MCH RBC QN AUTO: 31.1 PG (ref 26.5–33)
MCHC RBC AUTO-ENTMCNC: 33.2 G/DL (ref 31.5–36.5)
MCV RBC AUTO: 93.9 FL (ref 78–100)
PLATELET # BLD AUTO: 348 10E3/UL (ref 150–450)
POTASSIUM SERPL-SCNC: 4.5 MMOL/L (ref 3.4–5.3)
RBC # BLD AUTO: 2.28 10E6/UL (ref 3.8–5.2)
SODIUM SERPL-SCNC: 129 MMOL/L (ref 135–145)
WBC # BLD AUTO: 10.2 10E3/UL (ref 4–11)

## 2025-08-15 PROCEDURE — 85027 COMPLETE CBC AUTOMATED: CPT | Mod: ORL | Performed by: PHYSICIAN ASSISTANT

## 2025-08-15 PROCEDURE — 80048 BASIC METABOLIC PNL TOTAL CA: CPT | Mod: ORL | Performed by: PHYSICIAN ASSISTANT

## 2025-08-15 PROCEDURE — P9604 ONE-WAY ALLOW PRORATED TRIP: HCPCS | Mod: ORL | Performed by: PHYSICIAN ASSISTANT

## 2025-08-15 PROCEDURE — 36415 COLL VENOUS BLD VENIPUNCTURE: CPT | Mod: ORL | Performed by: PHYSICIAN ASSISTANT

## 2025-08-18 ENCOUNTER — LAB REQUISITION (OUTPATIENT)
Dept: LAB | Facility: CLINIC | Age: 89
End: 2025-08-18
Payer: COMMERCIAL

## 2025-08-18 DIAGNOSIS — D62 ACUTE POSTHEMORRHAGIC ANEMIA: ICD-10-CM

## 2025-08-19 LAB
ERYTHROCYTE [DISTWIDTH] IN BLOOD BY AUTOMATED COUNT: 15.9 % (ref 10–15)
FERRITIN SERPL-MCNC: 340 NG/ML (ref 11–328)
HCT VFR BLD AUTO: 26 % (ref 35–47)
HGB BLD-MCNC: 8.4 G/DL (ref 11.7–15.7)
IRON BINDING CAPACITY (ROCHE): 247 UG/DL (ref 240–430)
IRON SATN MFR SERPL: 19 % (ref 15–46)
IRON SERPL-MCNC: 46 UG/DL (ref 37–145)
MCH RBC QN AUTO: 31.7 PG (ref 26.5–33)
MCHC RBC AUTO-ENTMCNC: 32.3 G/DL (ref 31.5–36.5)
MCV RBC AUTO: 98.1 FL (ref 78–100)
PLATELET # BLD AUTO: 466 10E3/UL (ref 150–450)
RBC # BLD AUTO: 2.65 10E6/UL (ref 3.8–5.2)
TRANSFERRIN SERPL-MCNC: 219 MG/DL (ref 200–360)
WBC # BLD AUTO: 9.93 10E3/UL (ref 4–11)

## 2025-08-20 ENCOUNTER — LAB REQUISITION (OUTPATIENT)
Dept: LAB | Facility: CLINIC | Age: 89
End: 2025-08-20
Payer: COMMERCIAL

## 2025-08-20 DIAGNOSIS — D62 ACUTE POSTHEMORRHAGIC ANEMIA: ICD-10-CM

## 2025-08-21 LAB
ERYTHROCYTE [DISTWIDTH] IN BLOOD BY AUTOMATED COUNT: 16.2 % (ref 10–15)
HCT VFR BLD AUTO: 25.1 % (ref 35–47)
HGB BLD-MCNC: 8 G/DL (ref 11.7–15.7)
MCH RBC QN AUTO: 30.9 PG (ref 26.5–33)
MCHC RBC AUTO-ENTMCNC: 31.9 G/DL (ref 31.5–36.5)
MCV RBC AUTO: 96.9 FL (ref 78–100)
PLATELET # BLD AUTO: 483 10E3/UL (ref 150–450)
RBC # BLD AUTO: 2.59 10E6/UL (ref 3.8–5.2)
WBC # BLD AUTO: 7.05 10E3/UL (ref 4–11)

## 2025-08-27 ENCOUNTER — LAB REQUISITION (OUTPATIENT)
Dept: LAB | Facility: CLINIC | Age: 89
End: 2025-08-27
Payer: MEDICARE

## 2025-08-27 DIAGNOSIS — D64.9 ANEMIA, UNSPECIFIED: ICD-10-CM

## 2025-08-28 LAB
HGB BLD-MCNC: 8.7 G/DL (ref 11.7–15.7)
MCV RBC AUTO: 99.3 FL (ref 78–100)

## 2025-09-01 ENCOUNTER — LAB REQUISITION (OUTPATIENT)
Dept: LAB | Facility: CLINIC | Age: 89
End: 2025-09-01
Payer: COMMERCIAL

## 2025-09-01 DIAGNOSIS — K26.0 ACUTE DUODENAL ULCER WITH HEMORRHAGE: ICD-10-CM

## 2025-09-02 LAB
HGB BLD-MCNC: 10.1 G/DL (ref 11.7–15.7)
MCV RBC AUTO: 99 FL (ref 78–100)